# Patient Record
Sex: FEMALE | Race: WHITE | Employment: OTHER | URBAN - METROPOLITAN AREA
[De-identification: names, ages, dates, MRNs, and addresses within clinical notes are randomized per-mention and may not be internally consistent; named-entity substitution may affect disease eponyms.]

---

## 2024-02-15 NOTE — DISCHARGE INSTRUCTIONS
Procedure performed by      Biopsy/Aspiration of RIGHT THYROID GLAND  NODULE.    DISCHARGE INSTRUCTIONS                                 You may develop a sore throat after the procedure.  If necessary,                               throat lozenges may be used to relieve the discomfort.  Call your                               physician immediately if you experience increased swelling in your                                neck, difficulty breathing, or difficulty swallowing.                               DO NOT TAKE aspirin-containing products, Ibuprofen, Vitamin E, or                              blood thinning products for three (3) days after the procedure.  You may                             take Tylenol (1 or 2 tablets every 4-6 hours) for mild discomfort at the                             biopsy site. Avoid straining, heavy lifting, or strenuous physical activity                              today.  Report any bleeding at aspiration/biopsy site, redness,                             swelling, odor, discharge, pain or fever that does not lessen after one                              day, to your physician.  Resume a regular diet.  Call your physician with                             questions or test results.  Also you may contact the Radiology Nurse                             at [phone] with any additional questions or concerns.    Other: .YOU MAY USE A COLD PACK TO THE SITE IF NEEDED FOR COMFORT    BRING THIS SHEET WITH YOU SHOULD YOU HAVE TO VISIT AN EMERGENCY ROOM OR SEE YOUR DOCTOR IN THE NEXT 24 HOURS.

## 2024-02-16 ENCOUNTER — HOSPITAL ENCOUNTER (OUTPATIENT)
Dept: ULTRASOUND IMAGING | Facility: HOSPITAL | Age: 74
Discharge: HOME | End: 2024-02-16
Attending: INTERNAL MEDICINE
Payer: MEDICARE

## 2024-02-16 DIAGNOSIS — E04.1 NONTOXIC SINGLE THYROID NODULE: ICD-10-CM

## 2024-02-16 PROCEDURE — 88177 CYTP FNA EVAL EA ADDL: CPT | Performed by: INTERNAL MEDICINE

## 2024-02-16 PROCEDURE — 88173 CYTOPATH EVAL FNA REPORT: CPT | Performed by: INTERNAL MEDICINE

## 2024-02-16 PROCEDURE — 10005 FNA BX W/US GDN 1ST LES: CPT | Performed by: INTERNAL MEDICINE

## 2024-02-16 PROCEDURE — 88172 CYTP DX EVAL FNA 1ST EA SITE: CPT | Performed by: INTERNAL MEDICINE

## 2024-02-16 NOTE — IMAGING NOTE
1255: to [employer]. /84    1300 History taken and as follows:  found incidentally .      1300 Procedure explained questions answered.    1301 Consent verified and obtained      1337 Pt arrived to ultrasound room #2     1338 Scans taken by daisy ultrasound  sonographer     1348  scans reviewed by Dr. GOOD    Site marked RIGHT     1352 Time out taken      1352 Area cleaned sterile towels to site. Pathology was notified.    1358 Lidocaine 1% 10 milligrams per ml  from kit  was given 4ml     FNA # 1 taken at  1359 with 25 g needle    FNA # 2 taken at 1401  with 25 g needle    FNA # 3 taken at  1406 with 22g needle    1411 Procedure completed area re scanned . Area cleaned band aid to site ice pack to site.        1419 Post instructions given  verbal et written with AVS summary sheet provided to patient.  Also instructed patient to refrain from drinking or eating anything hot for several hours after biopsy  to prevent increase bleeding from occurring.    1421  Pt  discharged .

## 2024-02-27 ENCOUNTER — OFFICE VISIT (OUTPATIENT)
Dept: OTOLARYNGOLOGY | Facility: CLINIC | Age: 74
End: 2024-02-27
Payer: MEDICARE

## 2024-02-27 VITALS — TEMPERATURE: 98 F

## 2024-02-27 DIAGNOSIS — E04.1 THYROID NODULE: Primary | ICD-10-CM

## 2024-02-27 PROCEDURE — 99203 OFFICE O/P NEW LOW 30 MIN: CPT | Performed by: OTOLARYNGOLOGY

## 2024-02-27 RX ORDER — HYDROCHLOROTHIAZIDE 12.5 MG/1
1 CAPSULE, GELATIN COATED ORAL DAILY
COMMUNITY
Start: 2024-01-08

## 2024-02-27 RX ORDER — ALBUTEROL SULFATE 90 UG/1
2 AEROSOL, METERED RESPIRATORY (INHALATION) AS NEEDED
COMMUNITY

## 2024-02-27 RX ORDER — LISINOPRIL 10 MG/1
1 TABLET ORAL DAILY
COMMUNITY
Start: 2024-01-08

## 2024-02-27 RX ORDER — LOVASTATIN 20 MG/1
1 TABLET ORAL DAILY
COMMUNITY
Start: 2024-01-08

## 2024-10-01 ENCOUNTER — OFFICE VISIT (OUTPATIENT)
Dept: PULMONOLOGY | Facility: CLINIC | Age: 74
End: 2024-10-01

## 2024-10-01 VITALS
SYSTOLIC BLOOD PRESSURE: 122 MMHG | HEIGHT: 66.5 IN | BODY MASS INDEX: 24.36 KG/M2 | RESPIRATION RATE: 12 BRPM | OXYGEN SATURATION: 94 % | WEIGHT: 153.38 LBS | DIASTOLIC BLOOD PRESSURE: 81 MMHG | HEART RATE: 97 BPM

## 2024-10-01 DIAGNOSIS — R91.8 GROUND GLASS OPACITY PRESENT ON IMAGING OF LUNG: Primary | ICD-10-CM

## 2024-10-01 DIAGNOSIS — Z87.891 PERSONAL HISTORY OF NICOTINE DEPENDENCE: ICD-10-CM

## 2024-10-01 DIAGNOSIS — Z71.6 ENCOUNTER FOR TOBACCO USE CESSATION COUNSELING: ICD-10-CM

## 2024-10-01 DIAGNOSIS — R93.89 ABNORMAL CT OF THE CHEST: ICD-10-CM

## 2024-10-01 DIAGNOSIS — J44.9 CHRONIC OBSTRUCTIVE PULMONARY DISEASE, UNSPECIFIED COPD TYPE (HCC): ICD-10-CM

## 2024-10-01 PROBLEM — I10 PRIMARY HYPERTENSION: Status: ACTIVE | Noted: 2024-10-01

## 2024-10-01 PROBLEM — E78.49 OTHER HYPERLIPIDEMIA: Status: ACTIVE | Noted: 2024-10-01

## 2024-10-01 PROBLEM — J43.8 OTHER EMPHYSEMA (HCC): Status: ACTIVE | Noted: 2024-10-01

## 2024-10-01 PROBLEM — M81.0 AGE-RELATED OSTEOPOROSIS WITHOUT CURRENT PATHOLOGICAL FRACTURE: Status: ACTIVE | Noted: 2024-10-01

## 2024-10-01 PROCEDURE — 99205 OFFICE O/P NEW HI 60 MIN: CPT | Performed by: PHYSICIAN ASSISTANT

## 2024-10-01 PROCEDURE — 99406 BEHAV CHNG SMOKING 3-10 MIN: CPT | Performed by: PHYSICIAN ASSISTANT

## 2024-10-01 PROCEDURE — 94761 N-INVAS EAR/PLS OXIMETRY MLT: CPT | Performed by: PHYSICIAN ASSISTANT

## 2024-10-01 RX ORDER — ASPIRIN 81 MG/1
TABLET ORAL
COMMUNITY
Start: 2024-08-08

## 2024-10-01 RX ORDER — ALENDRONATE SODIUM 70 MG/1
TABLET ORAL
COMMUNITY
Start: 2024-01-08

## 2024-10-01 RX ORDER — CALCIUM CARBONATE/VITAMIN D3 600 MG-10
TABLET ORAL
COMMUNITY
Start: 2024-02-22

## 2024-10-01 RX ORDER — UMECLIDINIUM BROMIDE AND VILANTEROL TRIFENATATE 62.5; 25 UG/1; UG/1
1 POWDER RESPIRATORY (INHALATION) DAILY
Qty: 1 EACH | Refills: 2 | Status: SHIPPED | OUTPATIENT
Start: 2024-10-01

## 2024-10-01 RX ORDER — MAGNESIUM OXIDE 400 MG/1
400 TABLET ORAL DAILY
COMMUNITY

## 2024-10-01 NOTE — PROGRESS NOTES
Pulmonary Consult Note    History of Present Illness:  Malika Shelby is a 73 year old female presenting to pulmonary clinic today for abnormal CT chest, referred by PCP. Patient was hospitalized at [facility] 2024 and had CT chest which demonstrated thyroid enlargement causing leftward deviation of trachea, tree-in-bud opacities in right middle lobe and lingula, and ground-glass opacity in right lower lobe. She has history of COPD and had been on Trelegy at one point. She has albuterol inhaler but does not use. No recent PFTs. She is a current smoker, smokes 1 ppd for 60 years. She is not ready to quit smoking.  smokes as well and he does not want to quit. She has tried nicotine replacement therapy in the past without success. She has quit for a month at a time previously when hospitalized and sick. She has dyspnea on exertion with 1 flight of stairs, and this is worse x6 months. No shortness of breath at rest. She has chronic cough with clear phlegm for several years and is stable. She intermittently wheezes. No chest pain. No fever, chills, or night sweats. No weight loss or change in appetite. No history of venous thromboembolism or TB.    Past Medical History: COPD, thyroid nodule, hypertension, hyperlipidemia, osteoporosis    Past Surgical History: Partial thyroidectomy, cholecystectomy    Family Medical History: Mother  with old age, father  with Parkinson's disease    Social History: , has 2 kids, retired real estate  -Tobacco: Current smoker, 1 ppd for 60 years  -Alcohol: None  -Vaping: None    Allergies: Patient has no known allergies.     Medications: has a current medication list which includes the following prescription(s): alendronate, aspirin, calcium carb-cholecalciferol, hydrochlorothiazide, lisinopril, lovastatin, and albuterol.    Review of Systems:   Constitutional: No fever or chills. No weight loss or weight gain.  HEENT: +Rhinitis. No vision changes. No  congestion, postnasal drip, or sore throat.   Cardio: No chest pain or palpitations.  Respiratory: See HPI.  GI: No acid reflux.   Extremities: No lower extremity swelling or pain.   Neurologic: No headache.  Skin: No rash.  Psych: No depression.     Physical Exam:  /81   Pulse 97   Resp 12   Ht 5' 6.5\" (1.689 m)   Wt 153 lb 6.4 oz (69.6 kg)   SpO2 94%   BMI 24.39 kg/m²    Constitutional: No acute distress.  HEENT: Head NC/AT. PEERL. No tonsillar or uvula enlargement.  Cardio: Regular rate and rhythm. Normal S1 and S2. No murmurs.   Respiratory: Thorax symmetrical with no labored breathing. Markedly diminished breath sounds bilaterally. No wheezes or rales.  Extremities: No clubbing or cyanosis. No LE edema. No calf tenderness.  Neurologic: A&Ox3. No gross motor deficits.  Skin: Warm, dry. Mild clubbing.  Lymphatic: No cervical or supraclavicular lymphadenopathy.  Psych: Calm, cooperative. Pleasant affect.    Results:  -CT chest 1/8/2024:   1. The right lobe of the thyroid gland is enlarged and heterogeneous with several nodules. Correlate with ultrasound if indicated. This causes deviation of the trachea towards the left as seen on recent chest x-ray.   2. Calcified mediastinal and right hilar nodes are noted. This is related to remote granulomatous disease.   3. There are peripheral tree-in-bud opacities in the right middle lobe and lingula suggestive of endobronchial spread of infection with entities such as mycobacterium avium complex. Correlate clinically.   4. There is a groundglass opacity in the lateral aspect of the right lower lobe which could be inflammatory.   5. Moderate COPD.   6. There are coronary artery calcifications noted.   7. Lipomatous hypertrophy of the interatrial septum.   8. Simple-appearing cyst in the spleen measuring 2.3 x 2.1 cm.     -Echo 1/18/2024: LVEF 60-65%. Mild concentric LVH. Grade 1 diastolic dysfunction. Echodensity measuring 1.9 x 1.6 cm in right atrium along  septal wall.    -Ambulatory oximetry today in office: Oxygen saturations maintained 93% and above with ambulatory oximetry.    Assessment/Plan:  Abnormal CT chest  CT chest 1/2024 with tree-in-bud opacities which may represent chronic indolent infection such as mycobacterium avium complex. Briefly discussed evaluation and treatment of MAC, however, patient does not have systemic symptoms or worsening cough. CT chest also with ground-glass opacity in right lower lobe, size not mentioned. Will repeat CT chest now for follow up of ground-glass opacity. Thyroid enlargement causing leftward deviation of trachea but no mention of tracheal narrowing.  Plan:  -CT chest    COPD  Progressively worsening dyspnea. Not on maintenance inhaler therapy. Lungs diminished but clear. Did not desaturate with ambulatory oximetry.  Plan:  -PFTs  -Start LAMA/LABA (Anoro)  -Albuterol MDI as needed  -Recommend annual influenza vaccine and RSV vaccine  -Follow up in 6 weeks    Tobacco abuse  Current smoker, 1 ppd. Not ready to quit.  Plan:  -Smoking cessation; cessation literature provided    Cisco Musa PA-C  Pulmonary Medicine  10/1/2024

## 2024-10-01 NOTE — PATIENT INSTRUCTIONS
Call to schedule pulmonary function tests and CT chest at [phone].  Start Anoro 1 puff once daily. You may use albuterol inhaler 2 puffs as needed every 6 hours for shortness of breath, wheezing, and chest tightness.  Recommend influenza (flu) vaccine, RSV vaccine, and COVID-19 vaccine.    Quitting smoking is one of the most important things you can do for your health. The sooner you quit smoking, the greater the benefits. It is never too late to quit smoking.    Strategies for quitting smoking:  Set a quit date. If you have difficulty with \"all-or-nothing\"/cold turkey quitting, you can gradually cut down in anticipation of quit date further in the future.  Tell family, friends, and co-workers about your plan to quit and request support. Request individuals who also smoke to not smoke around you.  Remove tobacco and vaping products from your environment.  Total abstinence is essential. Not even a single puff after the quit date is important.  Anticipate potential withdrawal symptoms so you can have a plan in place to address symptoms if needed. Symptoms can include: cravings, depressed mood, anxiety, irritability/anger, restlessness, sleep disturbances, difficulty concentrating, and increased appetite.    Resources:  [place] Quit Line  Call 1-866-QUIT-Yes  http://quityes.org/    National Cancer [place] - many good resources  4-039-64K-QUIT  http://smokefree.gov/    SmokefreeTXT - get text reminders and tips and encouragment  http://smokefree.gov/smokefreetxt    Alexei Gallagher's Easy Way to Stop Smoking - book    Risks of smoking:  -Doubles a person's risk of developing coronary artery disease, a condition that can lead to heart attack. One year after stopping smoking, the risk of dying from coronary artery disease is reduced by approximately one-half and continues to decline over time.  -Increases risk of stroke.  -Increases risk of lung disease including chronic obstructive pulmonary disease. Much of the lung  damage that occurs from smoking is irreversible but quitting smoking can reduce further lung damage.  -Cigarette smoking is responsible for nearly 90% of cases of lung cancer.  -Increases risk of other types of cancer, including cancers of the head and neck, esophagus, pancreas, and bladder.  -Increases risk of osteoporosis.  -Increases risk of peptic ulcer disease.  -Contributes to erectile dysfunction.  -Secondhand smoke exposure increases risk of lung cancer, coronary artery disease, and stroke.

## 2024-11-11 ENCOUNTER — HOSPITAL ENCOUNTER (OUTPATIENT)
Dept: CT IMAGING | Facility: HOSPITAL | Age: 74
Discharge: HOME | End: 2024-11-11
Attending: PHYSICIAN ASSISTANT
Payer: MEDICARE

## 2024-11-11 ENCOUNTER — HOSPITAL ENCOUNTER (OUTPATIENT)
Dept: RESPIRATORY THERAPY | Facility: HOSPITAL | Age: 74
Discharge: HOME | End: 2024-11-11
Attending: PHYSICIAN ASSISTANT
Payer: MEDICARE

## 2024-11-11 DIAGNOSIS — R91.8 GROUND GLASS OPACITY PRESENT ON IMAGING OF LUNG: ICD-10-CM

## 2024-11-11 PROBLEM — J44.9 CHRONIC OBSTRUCTIVE PULMONARY DISEASE (HCC): Status: ACTIVE | Noted: 2024-10-01

## 2024-11-11 PROCEDURE — 94729 DIFFUSING CAPACITY: CPT | Performed by: INTERNAL MEDICINE

## 2024-11-11 PROCEDURE — 94060 EVALUATION OF WHEEZING: CPT | Performed by: INTERNAL MEDICINE

## 2024-11-11 PROCEDURE — 71250 CT THORAX DX C-: CPT | Performed by: PHYSICIAN ASSISTANT

## 2024-11-11 PROCEDURE — 94726 PLETHYSMOGRAPHY LUNG VOLUMES: CPT | Performed by: INTERNAL MEDICINE

## 2024-11-18 ENCOUNTER — TELEPHONE (OUTPATIENT)
Dept: PULMONOLOGY | Facility: CLINIC | Age: 74
End: 2024-11-18

## 2024-11-18 NOTE — TELEPHONE ENCOUNTER
Last office visit 10/1/24  CT Chest 11/11/24  CT CHEST (CPT=71250): Result Notes   Cisco Musa PA-C  11/18/2024  1:49 PM CST       Left voicemail for patient to call back. [employer] message sent as well.

## 2024-11-19 NOTE — TELEPHONE ENCOUNTER
I spoke with the patient. She is at the grocery store, unable to hear her. She will call back once she is in a quiet location.

## 2024-11-22 ENCOUNTER — TELEPHONE (OUTPATIENT)
Dept: PULMONOLOGY | Facility: CLINIC | Age: 74
End: 2024-11-22

## 2024-11-22 NOTE — TELEPHONE ENCOUNTER
Reviewed with Dr. Interiano.    Will send disc to radiology and request formal comparison by radiologist.     Tentative plan for follow up CT chest in 6 months.    [employer] message sent to patient.

## 2024-12-17 ENCOUNTER — OFFICE VISIT (OUTPATIENT)
Dept: PULMONOLOGY | Facility: CLINIC | Age: 74
End: 2024-12-17

## 2024-12-17 VITALS
HEIGHT: 66.5 IN | SYSTOLIC BLOOD PRESSURE: 138 MMHG | HEART RATE: 96 BPM | DIASTOLIC BLOOD PRESSURE: 78 MMHG | OXYGEN SATURATION: 96 % | BODY MASS INDEX: 24.78 KG/M2 | WEIGHT: 156 LBS

## 2024-12-17 DIAGNOSIS — J44.9 CHRONIC OBSTRUCTIVE PULMONARY DISEASE, UNSPECIFIED COPD TYPE (HCC): ICD-10-CM

## 2024-12-17 DIAGNOSIS — R91.8 GROUND GLASS OPACITY PRESENT ON IMAGING OF LUNG: Primary | ICD-10-CM

## 2024-12-17 DIAGNOSIS — Z72.0 TOBACCO ABUSE: ICD-10-CM

## 2024-12-17 PROCEDURE — 99214 OFFICE O/P EST MOD 30 MIN: CPT | Performed by: PHYSICIAN ASSISTANT

## 2024-12-17 RX ORDER — UMECLIDINIUM BROMIDE AND VILANTEROL TRIFENATATE 62.5; 25 UG/1; UG/1
1 POWDER RESPIRATORY (INHALATION) DAILY
Qty: 1 EACH | Refills: 5 | Status: SHIPPED | OUTPATIENT
Start: 2024-12-17

## 2024-12-17 NOTE — PROGRESS NOTES
Pulmonary Progress Note    History of Present Illness:  Malika Shelby is a 74 year old female presenting to pulmonary clinic today for follow up. The patient had CT chest 11/11/2024 for follow up of ground-glass opacity. This was compared with prior outside CT chest from 1/2024 and demonstrated stability. She had PFTs 11/2024 which demonstrated severe obstruction with significant bronchodilator response and severely decreased diffusion capacity. Anoro was started last month and she reports improvement in dyspnea. She has dyspnea with stairs but does feel this is somewhat improved. No shortness of breath at rest. She has a chronic cough with clear phlegm which is unchanged. No wheezing. She has not required albuterol. She states she got influenza, RSV, and COVID-19 vaccines 10/2024. She is still smoking 1 ppd and is not ready to quit or discuss assistance with smoking cessation.    Social History: , has 2 kids, retired real estate  -Tobacco: Current smoker, 1 ppd for 60 years  -Alcohol: None  -Vaping: None    Medications: has a current medication list which includes the following prescription(s): alendronate, aspirin, calcium carb-cholecalciferol, magnesium oxide, anoro ellipta, albuterol, hydrochlorothiazide, lisinopril, and lovastatin.    Review of Systems:   Constitutional: No fever or chills. No weight loss or weight gain.  HEENT: No congestion or postnasal drip.  Cardio: No chest pain.  Respiratory: See HPI.  GI: No acid reflux.  Extremities: No lower extremity swelling or pain.   Neurologic: No headache.  Skin: No rash.  Psych: No depression.     Physical Exam:  /78   Pulse 96   Ht 5' 6.5\" (1.689 m)   Wt 156 lb (70.8 kg)   SpO2 96%   BMI 24.80 kg/m²    Constitutional: No acute distress.   HEENT: Head NC/AT. PEERL. No tonsillar or uvula enlargement.   Cardio: Regular rate and rhythm. Normal S1 and S2. No murmurs.   Respiratory: Thorax symmetrical with no labored breathing. Markedly diminished  breath sounds bilaterally but otherwise clear to auscultation. No wheezing, rhonchi, or crackles.   Extremities: No clubbing or cyanosis. No LE edema. No calf tenderness.  Neurologic: A&Ox3. No gross motor deficits.  Skin: Warm, dry.  Psych: Calm, cooperative. Pleasant affect.    Results:  -Ambulatory oximetry in office 10/1/24: Oxygen saturations maintained 93% and above with ambulatory oximetry.     -PFTs 11/2024: Severe obstructive ventilatory pattern. There is significant bronchodilator response. Air trapping is present. Severe decrease in diffusing capacity.    -CT chest 11/2024:   1. There is a 2.2 cm ground-glass density nodule at the periphery of the right lower lobe.  Smaller additional subpleural 0.8 cm ground-glass density nodule in the right lower lobe.  No available comparison imaging at this institution.  If any available outside institution comparison exams are submitted, an addendum to this report can be generated with regards to nodule stability.  Differential considerations for ground-glass density nodules are broad and include both benign postinfectious/inflammatory processes as well as low-grade neoplasms/adenocarcinoma.  If further intervention is deferred, continued 6-12 month surveillance imaging is advised.   2. Emphysema.  Multifocal reticulonodular opacities and distal endobronchial impaction in the right middle lobe, anterior segment right upper lobe, and lingula.  Findings likely relate to small airways infection/bronchiolitis (potentially on the basis of chronic atypical mycobacterial infection).   3. Sequelae of remote granulomatous disease.   4. Multi-vessel coronary artery calcification with lipomatous hypertrophy of the interatrial septum.   5. Exophytic 2.9 cm right lower pole thyroid nodule.  Prior FNA of this nodule demonstrated a benign follicular nodule.   6. Cholecystectomy.   7. Well-circumscribed 2 cm low-density splenic lesions statistically represents a benign cyst or  hemangioma.   8. Well-circumscribed left adrenal nodule is compatible with a benign adenoma.   9. Lesser incidental findings as above.     Outside institution comparison chest CT from January, 2024 is now available.  Ground-glass density nodules in the right lower lobe (2.2 cm and 0.8 cm) both appear very similar in size and morphology as compared with that exam.  Given stability, continued 12 month chest CT surveillance is advised.    Dictated by (CST): Prakash Calvillo MD on 11/25/2024 at 2:58 PM       Finalized by (CST): Prakash Calvillo MD on 11/25/2024 at 3:00 PM       Assessment/Plan:  Abnormal CT chest  RLL ground-glass opacity  Stable on CT chest 11/2024 compared to outside CT chest 1/2024. Previously d/w pulmonary Dr. Interiano, recommends 6 month follow up CT chest. Discussed with patient possibility of biopsy given size but she prefers to monitor with serial CT at this time.  Plan:  -6 month follow up CT chest (due May 2025)    Severe COPD  PFTs with severe obstruction, + bronchodilator response, and severely reduced diffusion capacity. CT chest with emphysema. Started on LAMA/LABA (Anoro) at last office visit given progressive dyspnea and feels better with this. Has not needed albuterol and no exacerbations. UTD with influenza, RSV, COVID-19, and PCV vaccines.  Plan:  -Continue LAMA/LABA (Anoro)  -Albuterol MDI as needed    Tobacco abuse  Current smoker, 1 ppd. Not ready to quit.  Plan:  -Continue to encourage smoking cessation  -Cessation literature provided    Cisco Musa PA-C  Pulmonary Medicine  12/17/2024

## 2024-12-17 NOTE — PATIENT INSTRUCTIONS
Continue Anoro 1 puff daily.  You can use albuterol 2 puffs every 6 hours as needed for shortness of breath, wheezing, and chest tightness.  Call to schedule CT scan of the chest in 5 months (due May 2025).  Please follow up in office after you get CT scan done to discuss results.    Quitting smoking is one of the most important things you can do for your health. The sooner you quit smoking, the greater the benefits. It is never too late to quit smoking.    Strategies for quitting smoking:  Set a quit date. If you have difficulty with \"all-or-nothing\"/cold turkey quitting, you can gradually cut down in anticipation of quit date further in the future.  Tell family, friends, and co-workers about your plan to quit and request support. Request individuals who also smoke to not smoke around you.  Remove tobacco and vaping products from your environment.  Total abstinence is essential. Not even a single puff after the quit date is important.  Anticipate potential withdrawal symptoms so you can have a plan in place to address symptoms if needed. Symptoms can include: cravings, depressed mood, anxiety, irritability/anger, restlessness, sleep disturbances, difficulty concentrating, and increased appetite.    Resources:  [place] Quit Line  Call 1-866-QUIT-Yes  http://quityes.org/    National Cancer [place] - many good resources  4-183-21K-QUIT  http://smokefree.gov/    SmokefreeTXT - get text reminders and tips and encouragment  http://smokefree.gov/smokefreetxt    Alexei Gallagher's Easy Way to Stop Smoking - book    Risks of smoking:  -Doubles a person's risk of developing coronary artery disease, a condition that can lead to heart attack. One year after stopping smoking, the risk of dying from coronary artery disease is reduced by approximately one-half and continues to decline over time.  -Increases risk of stroke.  -Increases risk of lung disease including chronic obstructive pulmonary disease. Much of the lung damage that  occurs from smoking is irreversible but quitting smoking can reduce further lung damage.  -Cigarette smoking is responsible for nearly 90% of cases of lung cancer.  -Increases risk of other types of cancer, including cancers of the head and neck, esophagus, pancreas, and bladder.  -Increases risk of osteoporosis.  -Increases risk of peptic ulcer disease.  -Contributes to erectile dysfunction.  -Secondhand smoke exposure increases risk of lung cancer, coronary artery disease, and stroke.

## 2025-03-20 ENCOUNTER — OFFICE VISIT (OUTPATIENT)
Dept: OTOLARYNGOLOGY | Facility: CLINIC | Age: 75
End: 2025-03-20

## 2025-03-20 VITALS — BODY MASS INDEX: 24.78 KG/M2 | WEIGHT: 156 LBS | HEIGHT: 66.5 IN

## 2025-03-20 DIAGNOSIS — E04.1 THYROID NODULE: Primary | ICD-10-CM

## 2025-03-20 PROCEDURE — 99213 OFFICE O/P EST LOW 20 MIN: CPT | Performed by: OTOLARYNGOLOGY

## 2025-03-20 NOTE — PROGRESS NOTES
Malika Shelby is a 74 year old female.    Chief Complaint   Patient presents with    Thyroid Nodule     Yearly follow up       HISTORY OF PRESENT ILLNESS  She presents with a previous history of thyroidectomy on the left performed over 30 years ago for thyroid cyst.  She now presents with a recent history of being admitted to [facility] for imbalance and vertigo with workup demonstrating 3 cm thyroid nodule on the right.  She underwent a biopsy under ultrasound guidance demonstrating a benign follicular nodule.  Here to discuss further management.  Only euthyroid     3/20/25 I last saw her a year ago and at that time we did do an ultrasound-guided biopsy which demonstrated follicular nodularity with no evidence of malignancy.  Here for reevaluation.  Has not had any recent scans done.  Here to discuss further management.  No new signs, symptoms or complaints         Social History     Socioeconomic History    Marital status:    Tobacco Use    Smoking status: Every Day     Current packs/day: 1.00     Average packs/day: 1 pack/day for 60.5 years (60.5 ttl pk-yrs)     Types: Cigarettes     Start date: 10/1/1964    Smokeless tobacco: Never   Vaping Use    Vaping status: Never Used   Substance and Sexual Activity    Alcohol use: Not Currently       Family History   Problem Relation Age of Onset    No Known Problems Mother     Other (Parkinson's disease) Father        Past Medical History:    Thyroid condition       Past Surgical History:   Procedure Laterality Date    Cholecystectomy      Thyroidectomy      Partial         REVIEW OF SYSTEMS    System Neg/Pos Details   Constitutional Negative Fatigue, fever and weight loss.   ENMT Negative Drooling.   Eyes Negative Blurred vision and vision changes.   Respiratory Negative Dyspnea and wheezing.   Cardio Negative Chest pain, irregular heartbeat/palpitations and syncope.   GI Negative Abdominal pain and diarrhea.   Endocrine Negative Cold intolerance and  heat intolerance.   Neuro Negative Tremors.   Psych Negative Anxiety and depression.   Integumentary Negative Frequent skin infections, pigment change and rash.   Hema/Lymph Negative Easy bleeding and easy bruising.           PHYSICAL EXAM    Ht 5' 6.5\" (1.689 m)   Wt 156 lb (70.8 kg)   BMI 24.80 kg/m²        Constitutional Normal Overall appearance - Normal.   Psychiatric Normal Orientation - Oriented to time, place, person & situation. Appropriate mood and affect.   Neck Exam Normal Inspection - Normal. Palpation - Normal. Parotid gland - Normal. Thyroid gland -left hemithyroidectomy surgically missing   Eyes Normal Conjunctiva - Right: Normal, Left: Normal. Pupil - Right: Normal, Left: Normal. Fundus - Right: Normal, Left: Normal.   Neurological Normal Memory - Normal. Cranial nerves - Cranial nerves II through XII grossly intact.   Head/Face Normal Facial features - Normal. Eyebrows - Normal. Skull - Normal.        Nasopharynx Normal External nose - Normal. Lips/teeth/gums - Normal. Tonsils - Normal. Oropharynx - Normal.   Ears Normal Inspection - Right: Normal, Left: Normal. Canal - Right: Normal, Left: Normal. TM - Right: Normal, Left: Normal.   Skin Normal Inspection - Normal.        Lymph Detail Normal Submental. Submandibular. Anterior cervical. Posterior cervical. Supraclavicular.        Nose/Mouth/Throat Normal External nose - Normal. Lips/teeth/gums - Normal. Tonsils - Normal. Oropharynx - Normal.   Nose/Mouth/Throat Normal Nares - Right: Normal Left: Normal. Septum -Normal  Turbinates - Right: Normal, Left: Normal.       Current Outpatient Medications:     umeclidinium-vilanterol (ANORO ELLIPTA) 62.5-25 MCG/ACT Inhalation Aerosol Powder, Breath Activated, Inhale 1 puff into the lungs daily., Disp: 1 each, Rfl: 5    alendronate 70 MG Oral Tab, TAKE 1 TABLET BY MOUTH ONCE WEEKLY WITH WATER 30 MINUTES BEFORE FIRST FOOD / DRINK / MEDS. AVOID LYING DOWN FOR 30 MINUTES., Disp: , Rfl:     aspirin 81 MG Oral  Tab EC, Take 1 tablet every day by oral route for 90 days., Disp: , Rfl:     Calcium Carb-Cholecalciferol 600-10 MG-MCG Oral Tab, Take by mouth., Disp: , Rfl:     magnesium oxide 400 MG Oral Tab, Take 1 tablet (400 mg total) by mouth daily., Disp: , Rfl:     albuterol 108 (90 Base) MCG/ACT Inhalation Aero Soln, Inhale 2 puffs into the lungs as needed., Disp: , Rfl:     hydroCHLOROthiazide 12.5 MG Oral Cap, Take 1 capsule (12.5 mg total) by mouth daily., Disp: , Rfl:     lisinopril 10 MG Oral Tab, Take 1 tablet (10 mg total) by mouth daily., Disp: , Rfl:     Lovastatin 20 MG Oral Tab, Take 1 tablet (20 mg total) by mouth daily., Disp: , Rfl:   ASSESSMENT AND PLAN    1. Thyroid nodule  3 cm thyroid nodule a year ago.  I did recommend repeating an ultrasound to see if any changes in this nodule have occurred in the past year.  Previous hemithyroidectomy on the left.  We will call her with the results of her study.  - US THYROID (CPT=76536); Future        This note was prepared using Dragon Medical voice recognition dictation software. As a result errors may occur. When identified these errors have been corrected. While every attempt is made to correct errors during dictation discrepancies may still exist    Akhil Pedersen MD    3/20/2025    1:13 PM

## 2025-04-18 ENCOUNTER — HOSPITAL ENCOUNTER (OUTPATIENT)
Dept: ULTRASOUND IMAGING | Facility: HOSPITAL | Age: 75
Discharge: HOME | End: 2025-04-18
Attending: OTOLARYNGOLOGY
Payer: MEDICARE

## 2025-04-18 DIAGNOSIS — E04.1 THYROID NODULE: ICD-10-CM

## 2025-04-18 PROCEDURE — 76536 US EXAM OF HEAD AND NECK: CPT | Performed by: OTOLARYNGOLOGY

## 2025-05-01 ENCOUNTER — TELEPHONE (OUTPATIENT)
Dept: OTOLARYNGOLOGY | Facility: CLINIC | Age: 75
End: 2025-05-01

## 2025-05-12 ENCOUNTER — HOSPITAL ENCOUNTER (OUTPATIENT)
Dept: CT IMAGING | Facility: HOSPITAL | Age: 75
Discharge: HOME | End: 2025-05-12
Attending: PHYSICIAN ASSISTANT
Payer: MEDICARE

## 2025-05-12 DIAGNOSIS — R91.8 GROUND GLASS OPACITY PRESENT ON IMAGING OF LUNG: ICD-10-CM

## 2025-05-12 PROCEDURE — 71250 CT THORAX DX C-: CPT | Performed by: PHYSICIAN ASSISTANT

## 2025-05-14 ENCOUNTER — TELEPHONE (OUTPATIENT)
Dept: OTOLARYNGOLOGY | Facility: CLINIC | Age: 75
End: 2025-05-14

## 2025-05-29 ENCOUNTER — PATIENT MESSAGE (OUTPATIENT)
Dept: PULMONOLOGY | Facility: CLINIC | Age: 75
End: 2025-05-29

## 2025-05-29 DIAGNOSIS — R91.1 LUNG NODULE: Primary | ICD-10-CM

## 2025-05-29 DIAGNOSIS — R91.8 GROUND GLASS OPACITY PRESENT ON IMAGING OF LUNG: ICD-10-CM

## 2025-07-07 RX ORDER — UMECLIDINIUM BROMIDE AND VILANTEROL TRIFENATATE 62.5; 25 UG/1; UG/1
1 POWDER RESPIRATORY (INHALATION) DAILY
Qty: 60 EACH | Refills: 3 | Status: SHIPPED | OUTPATIENT
Start: 2025-07-07

## 2025-07-07 NOTE — TELEPHONE ENCOUNTER
Cisco Musa PA-C- please sign pended order for [facility], if agreeable    Last office visit: 12/17/2024 Follow up: 9/5/2025 Last refill: 12/15/2024

## (undated) NOTE — LETTER
[facility] ULTRASOUND  [address] [facility] [address]  [phone]  Authorization for Imaging Procedure    I hereby authorize                                        , my physician and his/her assistants (if applicable), which may include medical students, residents, and/or fellows, to perform the following procedure and administer such anesthesia as may be determined necessary by my physician: ULTRASOUND GUIDED FINE NEEDLE ASPIRATION BIOPSY RIGHT THYROID GLAND NODULE on Malika Shelby.    2.  I recognize that during the procedure, unforeseen conditions may necessitate additional or different procedures than those listed above. I, therefore, further authorize and request that the above-named physician, assistants, or designees perform such procedures as are, in their judgment, necessary and desirable.    3.  My physician has discussed prior to my procedure the potential benefits, risks and side effects of this procedure; the likelihood of achieving goals; and potential problems that might occur during recuperation. They also discussed reasonable alternatives to the procedure, including risks, benefits, and side effects related to the alternatives and risks related to not receiving this procedure. I have had all my questions answered and I acknowledge that no guarantee has been made as to the result that may be obtained.    4.  Should the need arise during my procedure, which includes change of level of care prior to discharge, I also consent to the administration of blood and/or blood products. Further, I understand that despite careful testing and screening of blood or blood products by collecting agencies, I may still be subject to ill effects as a result of receiving a blood transfusion and/or blood products. The following are some, but not all, of the potential risks that can occur: fever and allergic reactions, hemolytic reactions, transmission of diseases such as Hepatitis,  AIDS and Cytomegalovirus (CMV) and fluid overload. In the event that I wish to have an autologous transfusion of my own blood, or a directed donor transfusion, I will discuss this with my physician.  Check only if Refusing Blood or Blood Products  I understand refusal of blood or blood products as deemed necessary by my physician may have serious consequences to my condition to include possible death. I hereby assume responsibility for my refusal and release the hospital, its personnel, and my physicians from any responsibility for the consequences of my refusal.   [  ] Patient Refuses Blood      5.  I authorize the use of any specimen, organs, tissues, body parts or foreign objects that may be removed from my body during the procedure for diagnosis, research or teaching purposes and their subsequent disposal by hospital authorities. I also authorize the release of specimen test results and/or written reports to my treating physician on the hospital medical staff or other referring or consulting physicians involved in my care, at the discretion of the Pathologist or my treating physician.    6.  I consent to the photographing or videotaping of the procedures to be performed, including appropriate portions of my body for medical, scientific, or educational purposes, provided my identity is not revealed by the pictures or by descriptive texts accompanying them. If the procedure has been photographed/videotaped, the physician will obtain the original picture, image, videotape or CD. The hospital will not be responsible for storage, release or maintenance of the picture, image, tape or CD.   7.  I consent to the presence of a  or observers in the operating room as deemed necessary by my physician or their designees.    8.  I recognize that in the event my procedure results in extended X-Ray/fluoroscopy time, I may develop a skin reaction.    9.  If I have a Do Not Attempt Resuscitation (DNAR) order in  place, that status will be suspended while in the operating room, procedural suite, and during the recovery period unless otherwise explicitly stated by me (or a person authorized to consent on my behalf). The performing physician or my attending physician will determine when the applicable recovery period ends for purposes of reinstating the DNAR order.  10.  I acknowledge that my physician has explained sedation/analgesia administration to me including the risk and benefits I consent to the administration of sedation/analgesia as may be necessary or desirable in the judgment of my physician.      I CERTIFY THAT I HAVE READ AND FULLY UNDERSTAND THE ABOVE CONSENT FOR THE PROCEDURE.   Signature of Patient: _____________________________________________________________  Responsible person in case of minor, unconscious: ____________________________________  Relationship to patient:  __________________________________________________________  Signature of Witness: _______________________________Date: _________Time: __________    Statement of Physician: My signature below affirms that prior to the time of the procedure, I have explained to the patient and/or her guardian, the risks and benefits involved in the proposed treatment and any reasonable alternative to the proposed treatment. I have also explained the risks and benefits involved in the refusal of the proposed treatment and have answered the patient's questions. If I have a significant financial interest in a co-management agreement or a significant financial interest in any product or implant, or other significant relationship used in the procedure/surgery, I have disclosed this and had a discussion with my patient.  Signature of Physician:   _________________________________Date:_____________Time:________    Patient Name: Malika Shelby : 10/24/1950  Printed: February 15, 2024   Medical Record #: [id number]